# Patient Record
Sex: MALE | Race: WHITE | NOT HISPANIC OR LATINO | Employment: OTHER | URBAN - NONMETROPOLITAN AREA
[De-identification: names, ages, dates, MRNs, and addresses within clinical notes are randomized per-mention and may not be internally consistent; named-entity substitution may affect disease eponyms.]

---

## 2024-08-10 ENCOUNTER — APPOINTMENT (EMERGENCY)
Dept: CT IMAGING | Facility: HOSPITAL | Age: 84
End: 2024-08-10
Payer: MEDICARE

## 2024-08-10 ENCOUNTER — APPOINTMENT (EMERGENCY)
Dept: RADIOLOGY | Facility: HOSPITAL | Age: 84
End: 2024-08-10
Payer: MEDICARE

## 2024-08-10 ENCOUNTER — HOSPITAL ENCOUNTER (EMERGENCY)
Facility: HOSPITAL | Age: 84
Discharge: HOME | End: 2024-08-10
Attending: EMERGENCY MEDICINE
Payer: MEDICARE

## 2024-08-10 VITALS
SYSTOLIC BLOOD PRESSURE: 167 MMHG | DIASTOLIC BLOOD PRESSURE: 78 MMHG | OXYGEN SATURATION: 95 % | HEART RATE: 66 BPM | RESPIRATION RATE: 18 BRPM | TEMPERATURE: 97.2 F | WEIGHT: 261.47 LBS

## 2024-08-10 DIAGNOSIS — S02.401A CLOSED FRACTURE OF MAXILLARY SINUS, INITIAL ENCOUNTER (HCC): ICD-10-CM

## 2024-08-10 DIAGNOSIS — S22.42XA CLOSED FRACTURE OF MULTIPLE RIBS OF LEFT SIDE, INITIAL ENCOUNTER: Primary | ICD-10-CM

## 2024-08-10 PROCEDURE — 71250 CT THORAX DX C-: CPT

## 2024-08-10 PROCEDURE — 70486 CT MAXILLOFACIAL W/O DYE: CPT

## 2024-08-10 PROCEDURE — 99285 EMERGENCY DEPT VISIT HI MDM: CPT | Performed by: EMERGENCY MEDICINE

## 2024-08-10 PROCEDURE — 72170 X-RAY EXAM OF PELVIS: CPT

## 2024-08-10 PROCEDURE — 70450 CT HEAD/BRAIN W/O DYE: CPT

## 2024-08-10 PROCEDURE — 72125 CT NECK SPINE W/O DYE: CPT

## 2024-08-10 PROCEDURE — 99284 EMERGENCY DEPT VISIT MOD MDM: CPT

## 2024-08-10 PROCEDURE — 74176 CT ABD & PELVIS W/O CONTRAST: CPT

## 2024-08-10 PROCEDURE — 71045 X-RAY EXAM CHEST 1 VIEW: CPT

## 2024-08-10 RX ORDER — CLINDAMYCIN HCL 300 MG
300 CAPSULE ORAL EVERY 8 HOURS SCHEDULED
Qty: 21 CAPSULE | Refills: 0 | Status: SHIPPED | OUTPATIENT
Start: 2024-08-10 | End: 2024-08-17

## 2024-08-10 RX ORDER — OXYCODONE AND ACETAMINOPHEN 5; 325 MG/1; MG/1
1 TABLET ORAL EVERY 8 HOURS PRN
Qty: 12 TABLET | Refills: 0 | Status: SHIPPED | OUTPATIENT
Start: 2024-08-10 | End: 2024-08-20

## 2024-08-10 RX ORDER — DOCUSATE SODIUM 100 MG/1
100 CAPSULE, LIQUID FILLED ORAL EVERY 12 HOURS
Qty: 60 CAPSULE | Refills: 0 | Status: SHIPPED | OUTPATIENT
Start: 2024-08-10

## 2024-08-10 NOTE — DISCHARGE INSTRUCTIONS
Please use sinus precautions.  This means that:  1.  Do not blow your nose  2.  Do not drink through straws  3.  If you must sneeze, please do so with your mouth open    Please use the incentive spirometer provided to you to do 1 set of 10 deep breaths every hour while awake.  You do not need to wake up at night to do these exercises    You may use the prescribed pain medication as needed every 8 hours.  If you are using this medication, please also use the stool softener to avoid becoming constipated    Please begin taking the prescribed antibiotic.    Please schedule an appointment with the oral maxillofacial surgeons listed below or the oral maxillofacial surgeons of your choice

## 2024-08-10 NOTE — ED PROVIDER NOTES
Emergency Department Trauma Note  Bogdan Camarena 83 y.o. male MRN: [id number]  Unit/Bed#: ED 01/ED 01 Encounter: [id number]      Trauma Alert: Trauma Acuity: Trauma Evaluation  Model of Arrival:   via    Trauma Team: Current Providers  Attending Provider: Benoit Pollock MD  Registered Nurse: Ruddy Weaver RN  Consultants:     None      History of Present Illness     Chief Complaint:   Chief Complaint   Patient presents with    Fall     Pt trip and fall coming out of [employer] landing face forward. Takes aspirin     HPI:  Bogdan Camarena is a 83 y.o. male who presents with fall, head/face strike.  Mechanism:Details of Incident: trip and fall. on aspirin. Injury Date: 08/10/24 Injury Time: 1730      Tripped and fell forward onto face.  Complains of face/head pain.  Left sided chest/abdominal pain.  No shortness of breath.  No nausea or vomiting.      History provided by:  Patient   used: No    Fall  Mechanism of injury: fall    Injury location:  Face  Facial injury location:  L eye  Incident location:  Outdoors  Fall:     Fall occurred:  Standing    Impact surface:  Hard floor and concrete    Point of impact:  Face    Entrapped after fall: no    Suspicion of alcohol use: no    Suspicion of drug use: no    Tetanus status:  Unknown  Prior to arrival data:     Bystander interventions:  None    Patient ambulatory at scene: yes      Blood loss:  Minimal    Responsiveness at scene:  Alert    Orientation at scene:  Person, place, situation and time    Loss of consciousness: no      Amnesic to event: no      Airway interventions:  None    Immobilization:  C-collar    Airway condition since incident:  Stable    Breathing condition since incident:  Stable    Circulation condition since incident:  Stable    Mental status condition since incident:  Stable    Disability condition since incident:  Stable  Associated symptoms: chest pain    Associated symptoms: no abdominal pain, no back pain, no difficulty breathing, no  headaches, no hearing loss, no loss of consciousness, no nausea, no neck pain, no seizures and no vomiting      Review of Systems   Constitutional:  Negative for chills and fever.   HENT:  Negative for ear pain, hearing loss, sore throat, trouble swallowing and voice change.    Eyes:  Negative for pain and discharge.   Respiratory:  Negative for cough, shortness of breath and wheezing.    Cardiovascular:  Positive for chest pain. Negative for palpitations.   Gastrointestinal:  Negative for abdominal pain, blood in stool, constipation, diarrhea, nausea and vomiting.   Genitourinary:  Negative for dysuria, flank pain, frequency and hematuria.   Musculoskeletal:  Negative for back pain, joint swelling, neck pain and neck stiffness.   Skin:  Negative for rash and wound.   Neurological:  Negative for dizziness, seizures, loss of consciousness, syncope, facial asymmetry and headaches.   Psychiatric/Behavioral:  Negative for hallucinations, self-injury and suicidal ideas.    All other systems reviewed and are negative.      Historical Information     Immunizations:   Immunization History   Administered Date(s) Administered    COVID-19 MODERNA VACC 0.5 ML IM 01/17/2021, 02/14/2021, 11/10/2021    COVID-19 Moderna Vac BIVALENT 12 Yr+ IM 0.5 ML 10/07/2022    COVID-19 Moderna mRNA Vaccine 12 Yr+ 50 mcg/0.5 mL (Spikevax) 10/12/2023       No past medical history on file.  No family history on file.  No past surgical history on file.     No existing history information found.  No existing history information found.    Family History: No family history on file.    Meds/Allergies   None       Not on File    PHYSICAL EXAM    PE limited by: Nothing    Objective   Vitals:   First set: Temperature: (!) 97.2 °F (36.2 °C) (08/10/24 1755)  Pulse: 66 (08/10/24 1755)  Respirations: 20 (08/10/24 1755)  Blood Pressure: (!) 175/86 (08/10/24 1755)  SpO2: 95 % (08/10/24 1755)    Primary Survey:   (A) Airway: Intact  (B) Breathing: Intact  (C)  Circulation: Pulses:   Normal  (D) Disabliity: GCS 15  (E) Expose:  Completed    Secondary Survey: (Click on Physical Exam tab above)  Physical Exam  Vitals and nursing note reviewed.   Constitutional:       General: He is not in acute distress.     Appearance: He is well-developed.   HENT:      Head: Normocephalic.      Comments: Patient noted to have swelling and ecchymosis surrounding the left orbit.     Right Ear: External ear normal.      Left Ear: External ear normal.   Eyes:      General: No scleral icterus.        Right eye: No discharge.         Left eye: No discharge.      Extraocular Movements: Extraocular movements intact.      Conjunctiva/sclera: Conjunctivae normal.   Cardiovascular:      Rate and Rhythm: Normal rate and regular rhythm.      Heart sounds: Normal heart sounds. No murmur heard.  Pulmonary:      Effort: Pulmonary effort is normal.      Breath sounds: Normal breath sounds. No wheezing or rales.      Comments: No chest wall tenderness or crepitus.  Chest:      Chest wall: No tenderness.   Abdominal:      General: Bowel sounds are normal. There is no distension.      Palpations: Abdomen is soft.      Tenderness: There is no abdominal tenderness. There is no guarding or rebound.   Musculoskeletal:         General: No deformity. Normal range of motion.      Cervical back: Normal range of motion and neck supple.   Skin:     General: Skin is warm and dry.      Findings: No rash.   Neurological:      General: No focal deficit present.      Mental Status: He is alert and oriented to person, place, and time.      Cranial Nerves: No cranial nerve deficit.   Psychiatric:         Mood and Affect: Mood normal.         Behavior: Behavior normal.         Thought Content: Thought content normal.         Judgment: Judgment normal.         Cervical spine cleared by clinical criteria? No (imaging required)      Invasive Devices       None                   Lab Results:   Results Reviewed       None                    Imaging Studies:   Direct to CT: Yes  TRAUMA - CT chest abdomen pelvis wo contrast   Final Result by Josh Watts MD (08/10 1856)      Although the study was limited by the lack of intravenous contrast, there is no gross evidence of solid organ injury.      Acute fractures of the left fourth and sixth ribs.      No pneumothorax. Mild chronic interstitial lung changes as well as mild diffuse bronchial wall thickening. No infiltrate or consolidation.      No acute intra-abdominal abnormality. No free air or free fluid.         Workstation performed: [id number]         TRAUMA - CT head wo contrast   Final Result by Josh Watts MD (08/10 1829)      No acute intracranial abnormality.      Mild chronic small vessel ischemic changes.            Workstation performed: [id number]         TRAUMA - CT spine cervical wo contrast   Final Result by Josh Watts MD (08/10 1839)      No cervical spine fracture or traumatic malalignment.                  Workstation performed: [id number]         TRAUMA - CT facial bones wo contrast   Final Result by Josh Watts MD (08/10 1833)      Acute mild displaced comminuted fracture involving the anterior wall of the left maxillary sinus.      Suspected tiny acute nondisplaced fracture at the posterior wall of the left maxillary sinus         Workstation performed: [id number]         XR Trauma pelvis ap only 1 or 2 vw   Final Result by Josh Watts MD (08/10 1858)      No acute osseous abnormality.         Computerized Assisted Algorithm (CAA) may have been used to analyze all applicable images.         Workstation performed: [id number]         XR Trauma chest portable   Final Result by Josh Watts MD (08/10 1857)      No acute cardiopulmonary disease.            Workstation performed: [id number]               Procedures  Procedures         ED Course  ED Course as of 08/10/24 2107   Sat Aug 10, 2024   1913 Discussed with on-call OMFS as well  as on-call trauma, feel patient stable for discharge.  Recommend sinus precautions, antibiotics, outpatient follow-up.  Patient will be prescribed clindamycin, Percocet, Colace.  Patient provided with incentive spirometer.           Medical Decision Making  Based on the history and medical screening exam performed the diagnostic considerations include but are not limited to facial fractures, intracranial hemorrhage, cervical spine fracture, intrathoracic or intra-abdominal traumatic injury.  Long bone injuries.  Other traumatic injuries.    Based on the work-up performed in the emergency room which includes physical examination, and which may include laboratory studies and imaging as warranted including advanced imaging such as CT scan or ultrasound, the diagnostic considerations are narrowed to exclude limb or life-threatening process.    The patient is stable for discharge.    Patient's workup reveals maxillary sinus fracture and fractures of the left fourth and sixth ribs.  No other traumatic findings identified.  Discussed with on-call OMFS as well as on-call trauma, feel patient stable for discharge.  Recommend sinus precautions, antibiotics, outpatient follow-up.  Patient will be prescribed clindamycin, Percocet, Colace.  Patient provided with incentive spirometer.    Amount and/or Complexity of Data Reviewed  Radiology: ordered and independent interpretation performed. Decision-making details documented in ED Course.     Details: Traumatic findings include maxillary sinus fracture and left fourth and sixth rib fractures.  No pneumothorax.  Otherwise no other acute traumatic findings    Risk  OTC drugs.  Prescription drug management.                Disposition  [facility] Transfer: No  Final diagnoses:   Closed fracture of multiple ribs of left side, initial encounter   Closed fracture of maxillary sinus, initial encounter (HCC)     Time reflects when diagnosis was documented in both MDM as applicable and  the Disposition within this note       Time User Action Codes Description Comment    8/10/2024  7:15 PM Benoit Pollock [S22.42XA] Closed fracture of multiple ribs of left side, initial encounter     8/10/2024  7:15 PM Benoit Pollock [S02.401A] Closed fracture of maxillary sinus, initial encounter ([facility])           ED Disposition       ED Disposition   Discharge    Condition   Stable    Date/Time   Sat Aug 10, 2024  7:15 PM    Comment   Bogdan Perryz discharge to home/self care.                   Follow-up Information       Follow up With Specialties Details Why Contact Info    Alexis Estevez DO Family Medicine   [address]  [facility] [address]  [phone]      [facility] [place] [address]  [phone]          Discharge Medication List as of 8/10/2024  7:19 PM        START taking these medications    Details   clindamycin (CLEOCIN) 300 MG capsule Take 1 capsule (300 mg total) by mouth every 8 (eight) hours for 7 days, Starting Sat 8/10/2024, Until Sat 8/17/2024, Normal      docusate sodium (COLACE) 100 mg capsule Take 1 capsule (100 mg total) by mouth every 12 (twelve) hours, Starting Sat 8/10/2024, Normal      oxyCODONE-acetaminophen (Percocet) 5-325 mg per tablet Take 1 tablet by mouth every 8 (eight) hours as needed for moderate pain for up to 10 days Max Daily Amount: 3 tablets, Starting Sat 8/10/2024, Until Tue 8/20/2024 at 2359, Normal           No discharge procedures on file.    PDMP Review       None            ED Provider  Electronically Signed by           Benoit Pollock MD  08/10/24 [id number]

## 2024-08-10 NOTE — TRAUMA DOCUMENTATION
Patient given incentive spirometer at this time. Patient demonstrated understanding with teach back.

## 2024-08-30 ENCOUNTER — HOSPITAL ENCOUNTER (OUTPATIENT)
Dept: RADIOLOGY | Facility: HOSPITAL | Age: 84
End: 2024-08-30
Payer: MEDICARE

## 2024-08-30 DIAGNOSIS — R13.19 OTHER DYSPHAGIA: ICD-10-CM

## 2024-08-30 PROCEDURE — 92611 MOTION FLUOROSCOPY/SWALLOW: CPT

## 2024-08-30 PROCEDURE — 74230 X-RAY XM SWLNG FUNCJ C+: CPT

## 2024-08-30 NOTE — PROCEDURES
Video Swallow Study    Speech Pathology Videofluoroscopic Swallow Study (VFSS/VBSS/MBSS)      Patient Name: Bogdan Camarena    Today's Date: 8/30/2024    General Information;  Pt is a 84 y.o. male with a PMH remarkable for DM2, dyslipidemia, HTN, ansiety, CAD, vertigo, recent fall w/ rib fx and left orbital fx.    Current concerns for dysphagia include globus sensation and difficulty swallowing. Pt reports this started around the time of his fall. Reports it happens most with solid foods and pills. Does not follow w/ GI, does follow w/ ENT.    A VFSS was recommended to assess oropharyngeal stage swallowing skills at this time. Pt was viewed in lateral position and assessed with thin  liquid (by teaspoon, single and successive cup/straw sips), puree, soft moist food (sandwich) and solid food (cracker) and a13mm pill with thin liquid.      Oral stage:  Pt presented with minimal oral stage dysphagia.    Lip closure:  no escape  Mastication:  slow prolonged with complete re-collection,   Bolus Transport/Lingual Motion:  slowed tongue motion- piecemeal deglutition  Oral residue:  at least mild residue on oral structures,   Tongue Control:posterior escape of less than half of the bolus  Swallow Initiation: bolus head in valleculae    Pharyngeal stage:  Pt presented with minimal pharyngeal dysphagia.     Soft palate elevation:  no bolus between soft palate and pharyngeal wall   Laryngeal elevation: complete     Anterior hyoid excursion:  complete    Epiglottic movement:  complete    Laryngeal vestibule closure:   complete   Tongue base retraction:    trace column of contrast/air between TB and PW  Pharyngeal Stripping:  diminished    Pharyngeal Contraction:  incomplete     PES opening: partial with partial obstruction to flow- retrograde mvmt/retention of pill bolus above UES  Pharyngeal Residue:  No significant pharyngeal residuals- trace retrograde movement following sandwich from  UES    Management of food/liquid/barium tablet follows:   No aspiration or penetration on study.  No significant pharyngeal residuals. 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  Stasis of barium pill above UES    Penetration/Aspiration:  Thin: PAS - 1  Puree: PAS- 1  Solid: PAS- 1  Response to Aspiration: N/A           8-Point Penetration-Aspiration Scale   1 Material does not enter the airway   2 Material enters the airway, remains above the vocal folds, and is ejected  from the  airway    3 Material enters the airway, remains above the vocal folds, and is not ejected from the airway   4 Material enters the airway, contacts the vocal folds, and is ejected from the airway   5 Material enters the airway, contacts the vocal folds, and is not ejected from the airway    6 Material enters the airway, passes below the vocal folds and is ejected into the larynx or out of the airway    7 Material enters the airway, passes below the vocal folds, and is not ejected from the trachea despite effort    8 Material enters the airway, passes below the vocal folds, and no effort is made to eject         Strategies and Efficacy: Additional liquid wash required to clear barium pill from pharynx    Aspiration Response and Efficacy:  N/A    Esophageal stage:  Brief view of esophagus was completed.  Re: esophageal clearance     esophageal retention noted esophageal retention with retrograde flow below the PES noted     Assessment Summary:    Pt presents with minimal oropharyngeal dysphagia characterized by prolonged mastication and piecemeal deglutition, could be d/t current orbital bone fx and pain w/ mastication. Spillage of bolus into valleculae where swallow was triggered. Adequate laryngeal elevation/excursion and epiglottic inversion. No aspiration or penetration on study.  Noted anterior osteophyte at C5 which disrupted bolus flow and could be contributing to globus sensation.  No significant  pharyngeal residuals. 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  Stasis of barium pill above UES, required additional liquid wash to clear to upper esophagus where it again remained until additional liquid wash completed.  Of note suspected calcification of thyroid cartilage. Radiologist consulted and unable to fully correlate w/ previous studies, therefore recommend additional imaging.      Note: Images are available for review in PACS as desired.    Recommendations:   Recommended Diet:  regular diet and thin liquids   Recommended Form of Medications: Trial whole in puree  Aspiration precautions and compensatory swallowing strategies: upright posture and alternating bites and sips  Consider referral to:  back to ENT for evaluation of UES dysfunction; can consider neurosurg referral d/t osteophyte but pt declined  SLP Dysphagia therapy recommended: None    **Radiology recommends ordering additional imaging of C spine w/ obliques to  better view thyroid cartilage** PCP please consider    Results Reviewed with: patient     Whitney Kelechi, MS CCC-SLP  8/30/2024

## 2024-11-09 ENCOUNTER — APPOINTMENT (EMERGENCY)
Dept: CT IMAGING | Facility: HOSPITAL | Age: 84
End: 2024-11-09
Payer: MEDICARE

## 2024-11-09 ENCOUNTER — HOSPITAL ENCOUNTER (EMERGENCY)
Facility: HOSPITAL | Age: 84
Discharge: HOME | End: 2024-11-10
Attending: EMERGENCY MEDICINE | Admitting: EMERGENCY MEDICINE
Payer: MEDICARE

## 2024-11-09 ENCOUNTER — APPOINTMENT (EMERGENCY)
Dept: ULTRASOUND IMAGING | Facility: HOSPITAL | Age: 84
End: 2024-11-09
Payer: MEDICARE

## 2024-11-09 VITALS
HEART RATE: 57 BPM | WEIGHT: 253 LBS | OXYGEN SATURATION: 98 % | TEMPERATURE: 97.6 F | SYSTOLIC BLOOD PRESSURE: 167 MMHG | BODY MASS INDEX: 38.34 KG/M2 | DIASTOLIC BLOOD PRESSURE: 73 MMHG | RESPIRATION RATE: 16 BRPM | HEIGHT: 68 IN

## 2024-11-09 DIAGNOSIS — N43.3 HYDROCELE: ICD-10-CM

## 2024-11-09 DIAGNOSIS — K59.00 CONSTIPATION: ICD-10-CM

## 2024-11-09 DIAGNOSIS — N50.3 EPIDIDYMAL CYST: ICD-10-CM

## 2024-11-09 DIAGNOSIS — R10.9 FLANK PAIN: Primary | ICD-10-CM

## 2024-11-09 DIAGNOSIS — R79.89 ELEVATED SERUM CREATININE: ICD-10-CM

## 2024-11-09 LAB
ALBUMIN SERPL BCG-MCNC: 4.4 G/DL (ref 3.5–5)
ALP SERPL-CCNC: 39 U/L (ref 34–104)
ALT SERPL W P-5'-P-CCNC: 19 U/L (ref 7–52)
ANION GAP SERPL CALCULATED.3IONS-SCNC: 8 MMOL/L (ref 4–13)
AST SERPL W P-5'-P-CCNC: 21 U/L (ref 13–39)
BACTERIA UR QL AUTO: NORMAL /HPF
BASOPHILS # BLD AUTO: 0.05 THOUSANDS/ÂΜL (ref 0–0.1)
BASOPHILS NFR BLD AUTO: 0 % (ref 0–1)
BILIRUB SERPL-MCNC: 0.49 MG/DL (ref 0.2–1)
BILIRUB UR QL STRIP: NEGATIVE
BUN SERPL-MCNC: 22 MG/DL (ref 5–25)
CALCIUM SERPL-MCNC: 9.2 MG/DL (ref 8.4–10.2)
CHLORIDE SERPL-SCNC: 99 MMOL/L (ref 96–108)
CLARITY UR: CLEAR
CO2 SERPL-SCNC: 33 MMOL/L (ref 21–32)
COLOR UR: YELLOW
CREAT SERPL-MCNC: 1.73 MG/DL (ref 0.6–1.3)
EOSINOPHIL # BLD AUTO: 0.24 THOUSAND/ÂΜL (ref 0–0.61)
EOSINOPHIL NFR BLD AUTO: 2 % (ref 0–6)
ERYTHROCYTE [DISTWIDTH] IN BLOOD BY AUTOMATED COUNT: 13.3 % (ref 11.6–15.1)
GFR SERPL CREATININE-BSD FRML MDRD: 35 ML/MIN/1.73SQ M
GLUCOSE SERPL-MCNC: 153 MG/DL (ref 65–140)
GLUCOSE UR STRIP-MCNC: NEGATIVE MG/DL
HCT VFR BLD AUTO: 41.1 % (ref 36.5–49.3)
HGB BLD-MCNC: 12.8 G/DL (ref 12–17)
HGB UR QL STRIP.AUTO: ABNORMAL
IMM GRANULOCYTES # BLD AUTO: 0.05 THOUSAND/UL (ref 0–0.2)
IMM GRANULOCYTES NFR BLD AUTO: 0 % (ref 0–2)
KETONES UR STRIP-MCNC: NEGATIVE MG/DL
LACTATE SERPL-SCNC: 1.9 MMOL/L (ref 0.5–2)
LEUKOCYTE ESTERASE UR QL STRIP: NEGATIVE
LIPASE SERPL-CCNC: 35 U/L (ref 11–82)
LYMPHOCYTES # BLD AUTO: 2.45 THOUSANDS/ÂΜL (ref 0.6–4.47)
LYMPHOCYTES NFR BLD AUTO: 19 % (ref 14–44)
MCH RBC QN AUTO: 27.9 PG (ref 26.8–34.3)
MCHC RBC AUTO-ENTMCNC: 31.1 G/DL (ref 31.4–37.4)
MCV RBC AUTO: 90 FL (ref 82–98)
MONOCYTES # BLD AUTO: 0.94 THOUSAND/ÂΜL (ref 0.17–1.22)
MONOCYTES NFR BLD AUTO: 7 % (ref 4–12)
NEUTROPHILS # BLD AUTO: 8.91 THOUSANDS/ÂΜL (ref 1.85–7.62)
NEUTS SEG NFR BLD AUTO: 72 % (ref 43–75)
NITRITE UR QL STRIP: NEGATIVE
NON-SQ EPI CELLS URNS QL MICRO: NORMAL /HPF
NRBC BLD AUTO-RTO: 0 /100 WBCS
PH UR STRIP.AUTO: 6 [PH]
PLATELET # BLD AUTO: 216 THOUSANDS/UL (ref 149–390)
PMV BLD AUTO: 10.1 FL (ref 8.9–12.7)
POTASSIUM SERPL-SCNC: 3.5 MMOL/L (ref 3.5–5.3)
PROT SERPL-MCNC: 7.6 G/DL (ref 6.4–8.4)
PROT UR STRIP-MCNC: ABNORMAL MG/DL
RBC # BLD AUTO: 4.58 MILLION/UL (ref 3.88–5.62)
RBC #/AREA URNS AUTO: NORMAL /HPF
SODIUM SERPL-SCNC: 140 MMOL/L (ref 135–147)
SP GR UR STRIP.AUTO: 1.02 (ref 1–1.03)
UROBILINOGEN UR QL STRIP.AUTO: 0.2 E.U./DL
WBC # BLD AUTO: 12.64 THOUSAND/UL (ref 4.31–10.16)
WBC #/AREA URNS AUTO: NORMAL /HPF

## 2024-11-09 PROCEDURE — 76870 US EXAM SCROTUM: CPT

## 2024-11-09 PROCEDURE — 81001 URINALYSIS AUTO W/SCOPE: CPT | Performed by: EMERGENCY MEDICINE

## 2024-11-09 PROCEDURE — 96375 TX/PRO/DX INJ NEW DRUG ADDON: CPT

## 2024-11-09 PROCEDURE — 99285 EMERGENCY DEPT VISIT HI MDM: CPT | Performed by: EMERGENCY MEDICINE

## 2024-11-09 PROCEDURE — 96361 HYDRATE IV INFUSION ADD-ON: CPT

## 2024-11-09 PROCEDURE — 36415 COLL VENOUS BLD VENIPUNCTURE: CPT | Performed by: EMERGENCY MEDICINE

## 2024-11-09 PROCEDURE — 80053 COMPREHEN METABOLIC PANEL: CPT | Performed by: EMERGENCY MEDICINE

## 2024-11-09 PROCEDURE — 85025 COMPLETE CBC W/AUTO DIFF WBC: CPT | Performed by: EMERGENCY MEDICINE

## 2024-11-09 PROCEDURE — 83690 ASSAY OF LIPASE: CPT | Performed by: EMERGENCY MEDICINE

## 2024-11-09 PROCEDURE — 96376 TX/PRO/DX INJ SAME DRUG ADON: CPT

## 2024-11-09 PROCEDURE — 96374 THER/PROPH/DIAG INJ IV PUSH: CPT

## 2024-11-09 PROCEDURE — 74177 CT ABD & PELVIS W/CONTRAST: CPT

## 2024-11-09 PROCEDURE — 99284 EMERGENCY DEPT VISIT MOD MDM: CPT

## 2024-11-09 PROCEDURE — 83605 ASSAY OF LACTIC ACID: CPT | Performed by: EMERGENCY MEDICINE

## 2024-11-09 RX ORDER — OXYCODONE AND ACETAMINOPHEN 5; 325 MG/1; MG/1
1 TABLET ORAL EVERY 4 HOURS PRN
Qty: 9 TABLET | Refills: 0 | Status: SHIPPED | OUTPATIENT
Start: 2024-11-09

## 2024-11-09 RX ORDER — POLYETHYLENE GLYCOL 3350 17 G/17G
17 POWDER, FOR SOLUTION ORAL DAILY
Qty: 10 EACH | Refills: 0 | Status: SHIPPED | OUTPATIENT
Start: 2024-11-09

## 2024-11-09 RX ORDER — MORPHINE SULFATE 4 MG/ML
4 INJECTION, SOLUTION INTRAMUSCULAR; INTRAVENOUS ONCE
Status: COMPLETED | OUTPATIENT
Start: 2024-11-09 | End: 2024-11-09

## 2024-11-09 RX ORDER — SULFAMETHOXAZOLE AND TRIMETHOPRIM 800; 160 MG/1; MG/1
1 TABLET ORAL 2 TIMES DAILY
Qty: 14 TABLET | Refills: 0 | Status: SHIPPED | OUTPATIENT
Start: 2024-11-09 | End: 2024-11-09

## 2024-11-09 RX ORDER — OXYCODONE AND ACETAMINOPHEN 5; 325 MG/1; MG/1
1 TABLET ORAL EVERY 4 HOURS PRN
Qty: 9 TABLET | Refills: 0 | Status: SHIPPED | OUTPATIENT
Start: 2024-11-09 | End: 2024-11-09

## 2024-11-09 RX ORDER — ONDANSETRON 2 MG/ML
4 INJECTION INTRAMUSCULAR; INTRAVENOUS ONCE
Status: COMPLETED | OUTPATIENT
Start: 2024-11-09 | End: 2024-11-09

## 2024-11-09 RX ORDER — LEVOFLOXACIN 500 MG/1
500 TABLET, FILM COATED ORAL DAILY
Qty: 7 TABLET | Refills: 0 | Status: SHIPPED | OUTPATIENT
Start: 2024-11-09 | End: 2024-11-16

## 2024-11-09 RX ORDER — AMOXICILLIN 250 MG
1 CAPSULE ORAL DAILY
Qty: 20 TABLET | Refills: 0 | Status: SHIPPED | OUTPATIENT
Start: 2024-11-09

## 2024-11-09 RX ADMIN — IOHEXOL 100 ML: 350 INJECTION, SOLUTION INTRAVENOUS at 19:55

## 2024-11-09 RX ADMIN — SODIUM CHLORIDE 1000 ML: 0.9 INJECTION, SOLUTION INTRAVENOUS at 21:14

## 2024-11-09 RX ADMIN — MORPHINE SULFATE 4 MG: 4 INJECTION INTRAVENOUS at 18:43

## 2024-11-09 RX ADMIN — ONDANSETRON 4 MG: 2 INJECTION INTRAMUSCULAR; INTRAVENOUS at 17:26

## 2024-11-09 RX ADMIN — SODIUM CHLORIDE 1000 ML: 0.9 INJECTION, SOLUTION INTRAVENOUS at 17:24

## 2024-11-09 RX ADMIN — MORPHINE SULFATE 2 MG: 2 INJECTION, SOLUTION INTRAMUSCULAR; INTRAVENOUS at 17:27

## 2024-11-09 NOTE — ED PROVIDER NOTES
ED Disposition       None          Assessment & Plan       Medical Decision Making  Amount and/or Complexity of Data Reviewed  Labs: ordered.  Radiology: ordered.    Risk  Prescription drug management.        ED Course as of 11/09/24 2124   Sat Nov 09, 2024 2050 Patient states pain reasonably controlled.  Placed on oxygen by nursing due to low oxygen saturation, suspected to be due to medication.  Patient denied any shortness of breath.  No wheezing.  Still not able to give a urine sample.  Declining straight catheterization at this time.  Additional IV fluids ordered.  Awaiting CT scan results.  Labs and ultrasound results discussed with patient.   2123 Care turned over to Dr. Calvin pending CT results, reassessment, further disposition as indicated.       Medications   sodium chloride 0.9 % bolus 1,000 mL (1,000 mL Intravenous New Bag 11/9/24 2114)   sodium chloride 0.9 % bolus 1,000 mL (0 mL Intravenous Stopped 11/9/24 1845)   morphine injection 2 mg (2 mg Intravenous Given 11/9/24 1727)   ondansetron (ZOFRAN) injection 4 mg (4 mg Intravenous Given 11/9/24 1726)   morphine injection 4 mg (4 mg Intravenous Given 11/9/24 1843)   iohexol (OMNIPAQUE) 350 MG/ML injection (SINGLE-DOSE) 100 mL (100 mL Intravenous Given 11/9/24 1955)       ED Risk Strat Scores                           SBIRT 22yo+      Flowsheet Row Most Recent Value   Initial Alcohol Screen: US AUDIT-C     1. How often do you have a drink containing alcohol? 0 Filed at: 11/09/2024 1646   2. How many drinks containing alcohol do you have on a typical day you are drinking?  0 Filed at: 11/09/2024 1646   3b. FEMALE Any Age, or MALE 65+: How often do you have 4 or more drinks on one occassion? 0 Filed at: 11/09/2024 1646   Audit-C Score 0 Filed at: 11/09/2024 1646   BRODY: How many times in the past year have you...    Used an illegal drug or used a prescription medication for non-medical reasons? Never Filed at: 11/09/2024 1646                             History of Present Illness       Chief Complaint   Patient presents with    Flank Pain     Pt presented to this ED with spouse c/o right flank pain radiating to groin w/right testicle swelling starting today. Denies travel/sob/fevers/cough/n/v/d       History reviewed. No pertinent past medical history.   History reviewed. No pertinent surgical history.   History reviewed. No pertinent family history.   Social History     Tobacco Use    Smoking status: Former     Types: Cigarettes    Smokeless tobacco: Never   Substance Use Topics    Alcohol use: Not Currently    Drug use: Never      E-Cigarette/Vaping      E-Cigarette/Vaping Substances      I have reviewed and agree with the history as documented.     Patient presents to the emergency department accompanied by his wife for evaluation of right flank pain that radiates into the right groin along with right testicular pain and swelling.  Symptoms started acutely at 1400.  Complains of nausea.  Has not vomited.  Rates pain as moderate to severe.  Denies prior similar symptoms.  No recent fevers.  Onset while showering.  No medications taken prior to arrival.  No known history of kidney stones.  No known hernia.  History of coronary artery disease, diabetes.  No other complaints, modifying factors, or associated symptoms.        Review of Systems   All other systems reviewed and are negative.          Objective       ED Triage Vitals [11/09/24 1555]   Temperature Pulse Blood Pressure Respirations SpO2 Patient Position - Orthostatic VS   97.6 °F (36.4 °C) 72 (!) 179/82 18 95 % Sitting      Temp src Heart Rate Source BP Location FiO2 (%) Pain Score    -- Monitor Right arm -- 9      Vitals      Date and Time Temp Pulse SpO2 Resp BP Pain Score FACES Pain Rating User   11/09/24 2100 -- 63 97 % 17 163/71 -- -- MD   11/09/24 2015 -- 77 87 % -- -- -- -- AL   11/09/24 1843 -- -- -- -- -- 10 - Worst Possible Pain -- RR   11/09/24 1727 -- -- -- -- -- 9 -- RR   11/09/24 1555  97.6 °F (36.4 °C) 72 95 % 18 179/82 9 -- AC            Physical Exam  Vitals and nursing note reviewed.   Constitutional:       General: He is not in acute distress.     Appearance: He is well-developed. He is not ill-appearing, toxic-appearing or diaphoretic.      Comments: Appears uncomfortable.   HENT:      Head: Normocephalic and atraumatic.   Eyes:      Conjunctiva/sclera: Conjunctivae normal.   Cardiovascular:      Rate and Rhythm: Normal rate and regular rhythm.      Heart sounds: No murmur heard.  Pulmonary:      Effort: Pulmonary effort is normal. No respiratory distress.      Breath sounds: Normal breath sounds.   Abdominal:      General: Abdomen is flat.      Palpations: Abdomen is soft.      Tenderness: There is no abdominal tenderness. There is no guarding or rebound.   Genitourinary:     Testes: Cremasteric reflex is present.         Right: Tenderness and swelling present. Mass not present. Cremasteric reflex is present.    Musculoskeletal:         General: No swelling. Normal range of motion.      Cervical back: Neck supple.   Skin:     General: Skin is warm and dry.      Capillary Refill: Capillary refill takes less than 2 seconds.   Neurological:      General: No focal deficit present.      Mental Status: He is alert and oriented to person, place, and time.   Psychiatric:         Mood and Affect: Mood normal.         Behavior: Behavior normal.         Results Reviewed       Procedure Component Value Units Date/Time    Comprehensive metabolic panel [[id number]]  (Abnormal) Collected: 11/09/24 1717    Lab Status: Final result Specimen: Blood from Hand, Left Updated: 11/09/24 8770     Sodium 140 mmol/L      Potassium 3.5 mmol/L      Chloride 99 mmol/L      CO2 33 mmol/L      ANION GAP 8 mmol/L      BUN 22 mg/dL      Creatinine 1.73 mg/dL      Glucose 153 mg/dL      Calcium 9.2 mg/dL      AST 21 U/L      ALT 19 U/L      Alkaline Phosphatase 39 U/L      Total Protein 7.6 g/dL      Albumin 4.4 g/dL       Total Bilirubin 0.49 mg/dL      eGFR 35 ml/min/1.73sq m     Narrative:      National Kid[employer] guidelines for Chronic Kidney Disease (CKD):     Stage 1 with normal or high GFR (GFR > 90 mL/min/1.73 square meters)    Stage 2 Mild CKD (GFR = 60-89 mL/min/1.73 square meters)    Stage 3A Moderate CKD (GFR = 45-59 mL/min/1.73 square meters)    Stage 3B Moderate CKD (GFR = 30-44 mL/min/1.73 square meters)    Stage 4 Severe CKD (GFR = 15-29 mL/min/1.73 square meters)    Stage 5 End Stage CKD (GFR <15 mL/min/1.73 square meters)  Note: GFR calculation is accurate only with a steady state creatinine    Lipase [[id number]]  (Normal) Collected: 11/09/24 1717    Lab Status: Final result Specimen: Blood from Hand, Left Updated: 11/09/24 1753     Lipase 35 u/L     Lactic acid, plasma (w/reflex if result > 2.0) [[id number]]  (Normal) Collected: 11/09/24 1717    Lab Status: Final result Specimen: Blood from Hand, Left Updated: 11/09/24 1753     LACTIC ACID 1.9 mmol/L     Narrative:      Result may be elevated if tourniquet was used during collection.    CBC and differential [[id number]]  (Abnormal) Collected: 11/09/24 1717    Lab Status: Final result Specimen: Blood from Arm, Left Updated: 11/09/24 1736     WBC 12.64 Thousand/uL      RBC 4.58 Million/uL      Hemoglobin 12.8 g/dL      Hematocrit 41.1 %      MCV 90 fL      MCH 27.9 pg      MCHC 31.1 g/dL      RDW 13.3 %      MPV 10.1 fL      Platelets 216 Thousands/uL      nRBC 0 /100 WBCs      Segmented % 72 %      Immature Grans % 0 %      Lymphocytes % 19 %      Monocytes % 7 %      Eosinophils Relative 2 %      Basophils Relative 0 %      Absolute Neutrophils 8.91 Thousands/µL      Absolute Immature Grans 0.05 Thousand/uL      Absolute Lymphocytes 2.45 Thousands/µL      Absolute Monocytes 0.94 Thousand/µL      Eosinophils Absolute 0.24 Thousand/µL      Basophils Absolute 0.05 Thousands/µL     UA w Reflex to Microscopic w Reflex to Culture [[id number]]     Lab Status:  No result Specimen: Urine             US scrotum and testicles   Final Interpretation by Kevin Ramirez MD (11/09 2010)      7.5 x 6.4 x 3.8 cm complex hypoechoic structure/area adjacent to the right testicle. Differential diagnosis would include a large epididymal cyst. A complex hydrocele is considered less likely.      Workstation performed: [id number]         CT abdomen pelvis with contrast    (Results Pending)       Procedures    ED Medication and Procedure Management   Prior to Admission Medications   Prescriptions Last Dose Informant Patient Reported? Taking?   docusate sodium (COLACE) 100 mg capsule   No No   Sig: Take 1 capsule (100 mg total) by mouth every 12 (twelve) hours      Facility-Administered Medications: None     Patient's Medications   Discharge Prescriptions    No medications on file     No discharge procedures on file.  ED SEPSIS DOCUMENTATION            Romain Franz MD  11/09/24 5766

## 2024-11-10 NOTE — DISCHARGE INSTRUCTIONS
You were seen in the emergency department for flank pain.  Your ultrasound results are below.  Your CT scan results showed a large scrotal hydrocele, gastritis or inflammation of the stomach, a large amount of stool in the colon, bilateral small inguinal hernias that contain fat.  We provided you with follow-up to urology.  Please call their office and schedule an appointment.  You also must follow-up with your primary care doctor for repeat blood work to check on your kidney function.  If your symptoms worsen or persist, please return to the emergency department immediately.      TESTES:  Testes are symmetric and normal in size.     RIGHT testis = 4.7 x 2.6 x 2.1 cm. Volume 13.6 mL  Normal contour with homogeneous smooth echotexture.  No intratesticular mass lesion or calcifications. Right testis appendix.     LEFT testis = 4.0 x 2.8 x 2.5 cm. Volume 14.6 mL  Normal contour with homogeneous smooth echotexture.  No intratesticular mass lesion or calcifications.     Doppler flow within both testes is present and symmetric.     EPIDIDYMIDES:  A normal right epididymis is not seen. There is a 6.4 x 7.5 x 3.8 centimeters complex hypoechoic area adjacent to the right testicle. As a normal-appearing right epididymis is not seen, it is uncertain whether this may represent a large epididymal cyst.   Alternatively, a complex hydrocele is not able to be excluded.  Doppler ultrasound demonstrates normal blood flow.  No epididymal lesions.     HYDROCELE: Complex left hydrocele.     VARICOCELE: None present.     SCROTUM: Scrotal thickness and appearance within normal limits. No evidence for hernia demonstrated.     IMPRESSION:     7.5 x 6.4 x 3.8 cm complex hypoechoic structure/area adjacent to the right testicle. Differential diagnosis would include a large epididymal cyst. A complex hydrocele is considered less likely.

## 2024-11-10 NOTE — ED CARE HANDOFF
Emergency Department Sign Out Note        Sign out and transfer of care from Dr. Franz. See Separate Emergency Department note.     The patient, Bogdan Camarena, was evaluated by the previous provider for flank pain.    Workup Completed:  Scrotal ultrasound, and CT scan of abdomen and pelvis, blood work    ED Course / Workup Pending (followup):  CT scan results                                  ED Course as of 11/09/24 3054   Sat Nov 09, 2024 2327 Discussed results with the patient.  Will provide him with urology follow-up and primary care follow-up for repeat blood work in the setting of his elevated creatinine.  Will start patient on antibiotics with concern for epididymitis.  Strict return precautions given.  Patient understands and agrees with treatment plan.     Procedures  Medical Decision Making  Amount and/or Complexity of Data Reviewed  Labs: ordered.  Radiology: ordered.    Risk  Prescription drug management.            Disposition  Final diagnoses:   None     ED Disposition       None          Follow-up Information    None       Patient's Medications   Discharge Prescriptions    No medications on file     No discharge procedures on file.       ED Provider  Electronically Signed by     Abram Calvin DO  11/09/24 1071

## 2024-11-10 NOTE — ED NOTES
Pt found to be 87% on RA. Placed on 3L of 02 Nasal Canula at this time. Pt has no complaints at this time. Provider made aware     Ruddy Weaver RN  11/09/24 2020

## 2024-11-11 ENCOUNTER — TELEPHONE (OUTPATIENT)
Age: 84
End: 2024-11-11

## 2024-11-11 NOTE — TELEPHONE ENCOUNTER
Wife of NP calling to schedule ED f/u appt for Epididymal cyst. Pt had US completed on 11/9/24 which showed:    IMPRESSION:     7.5 x 6.4 x 3.8 cm complex hypoechoic structure/area adjacent to the right testicle. Differential diagnosis would include a large epididymal cyst. A complex hydrocele is considered less likely.    Pt scheduled for next available on 12/4/24 at 830am.

## 2024-11-13 NOTE — TELEPHONE ENCOUNTER
Spoke with patient and made him aware that we do not have any sooner appointments. Patient verbalized understanding of message. He said that his PCP gave him pain medication at this time. Reviewed ER precautions with patient and he verbalized understanding of message.

## 2024-11-13 NOTE — TELEPHONE ENCOUNTER
Jumana from PCP office calling to see if sooner appt available for pt to be seen for epididymal cyst due to pt is having excruciating pain.     No sooner appts available at this time and PCP office is asking if clinical can review and get appt moved up. Office will be faxing over last OV notes.     Pt call back- [phone]

## 2024-11-19 ENCOUNTER — TELEPHONE (OUTPATIENT)
Dept: UROLOGY | Facility: CLINIC | Age: 84
End: 2024-11-19

## 2024-11-19 DIAGNOSIS — R35.1 NOCTURIA: Primary | ICD-10-CM

## 2024-11-19 RX ORDER — ROSUVASTATIN CALCIUM 40 MG/1
TABLET, COATED ORAL
COMMUNITY

## 2024-11-19 RX ORDER — LOSARTAN POTASSIUM 100 MG/1
TABLET ORAL
COMMUNITY

## 2024-11-19 RX ORDER — IPRATROPIUM BROMIDE 42 UG/1
SPRAY, METERED NASAL
COMMUNITY
Start: 2024-06-21

## 2024-11-19 RX ORDER — FELODIPINE 10 MG/1
TABLET, EXTENDED RELEASE ORAL
COMMUNITY

## 2024-11-19 RX ORDER — GABAPENTIN 400 MG/1
CAPSULE ORAL
COMMUNITY
Start: 2024-08-29

## 2024-11-19 RX ORDER — ASPIRIN 81 MG/1
TABLET, CHEWABLE ORAL
COMMUNITY

## 2024-11-19 RX ORDER — HYDROCHLOROTHIAZIDE 25 MG/1
TABLET ORAL
COMMUNITY

## 2024-11-19 RX ORDER — LORATADINE 10 MG/1
1 TABLET ORAL DAILY
COMMUNITY

## 2024-11-19 RX ORDER — METOPROLOL TARTRATE 100 MG/1
TABLET ORAL
COMMUNITY

## 2024-11-19 RX ORDER — DIAZEPAM 5 MG/1
TABLET ORAL
COMMUNITY

## 2024-11-19 RX ORDER — MAGNESIUM 200 MG
TABLET ORAL
COMMUNITY

## 2024-11-19 RX ORDER — NITROGLYCERIN 0.4 MG/1
TABLET SUBLINGUAL
COMMUNITY

## 2024-11-19 RX ORDER — PANTOPRAZOLE SODIUM 40 MG/1
TABLET, DELAYED RELEASE ORAL
COMMUNITY

## 2024-11-19 RX ORDER — MECLIZINE HYDROCHLORIDE 25 MG/1
TABLET ORAL
COMMUNITY

## 2024-11-19 RX ORDER — CHOLECALCIFEROL (VITAMIN D3) 25 MCG
TABLET ORAL
COMMUNITY

## 2024-11-25 PROBLEM — N50.89 TESTICULAR SWELLING, RIGHT: Status: ACTIVE | Noted: 2024-11-25

## 2024-11-25 PROBLEM — N43.3 HYDROCELE: Status: ACTIVE | Noted: 2024-11-25

## 2024-11-25 PROBLEM — N28.1 RENAL CYST: Status: ACTIVE | Noted: 2024-11-25

## 2024-11-26 NOTE — PROGRESS NOTES
UROLOGY PROGRESS NOTE         NAME: Bogdan Camarena  AGE: 84 y.o. SEX: male  : 1940   MRN: [id number]    DATE: 2024  TIME: 8:38 AM    Assessment and Plan   Procedures     Impression:   1. Testicular swelling, right  2. Renal cyst  3. Hydrocele, unspecified hydrocele type  4. Epididymal cyst  -     Ambulatory Referral to Urology  5. Hydrocele  -     Ambulatory Referral to Urology       Plan: Patient with a right epididymal cyst intermittent discomfort currently asymptomatic options include observation, or surgical excision in OR.    Patient elects for observation he will follow-up with me as needed.  Certainly in the future if it becomes more bothersome and wants to consider surgery happy to see him back.  I did recommend him to have yearly PSAs and JAMES's with his family physician.  He understands and agrees      Chief Complaint     Chief Complaint   Patient presents with    New Patient Visit    Testicle Pain     Rgiht testicle pain, 4/10 pain, swelling. Not taking anything for pain.      History of Present Illness     HPI: Bogdan Camarena is a 84 y.o. year old male who presents with follow-up ultrasound of the scrotum 2024 that showed a large 7.5 cm complex structure again to the right testicle complex hydrocele versus large epididymal cyst.  Also had a complex left hydrocele.  No intratesticular mass.    He was also having right flank pain and right testicular pain and on 2024 had a CAT scan that showed no hydronephrosis or stones.  Benign renal cysts.  Moderate prostatic enlargement.  PSA is pending.  PSA 2023 was 3.3.    Currently patient not having pain.  No irritative or obstructive voiding complaints no previous  medical surgical history has ED nonbothersome.  Reviewed CAT scan showing the renal cysts and also reviewed the scrotal ultrasound.    The following portions of the patient's history were reviewed and updated as appropriate: allergies, current medications, past family history,  "past medical history, past social history, past surgical history and problem list.  Past Medical History:   Diagnosis Date    Coronary artery disease     Diabetes mellitus (HCC)     Hypertension      Past Surgical History:   Procedure Laterality Date    BACK SURGERY      CAROTID STENT      CATARACT EXTRACTION      REPLACEMENT TOTAL KNEE Right      shoulder  Review of Systems     Const: Denies chills, fever and weight loss.  CV: Denies chest pain.  Resp: Denies SOB.  GI: Denies abdominal pain, nausea and vomiting.  : Denies symptoms other than stated above.  Musculo: Denies back pain.    Objective   /72   Pulse 79   Temp 97.8 °F (36.6 °C)   Ht 5' 8\" (1.727 m)   Wt 115 kg (254 lb 9.6 oz)   SpO2 96%   BMI 38.71 kg/m²     Physical Exam  Const: Appears healthy and well developed. No signs of acute distress present.  Resp: Respirations are regular and unlabored.   CV: Rate is regular. Rhythm is regular.  Abdomen: Abdomen is soft, nontender, and nondistended. Kidneys are not palpable.  : He had a right hydrocele very small subclinical left hydrocele testicular exam was normal.  Prostate benign feeling smooth no masses or nodules  Psych: Patient's attitude is cooperative. Mood is normal. Affect is normal.    Current Medications     Current Outpatient Medications:     aspirin 81 mg chewable tablet, Chew, Disp: , Rfl:     cholecalciferol (VITAMIN D3) 1,000 units tablet, Take by mouth, Disp: , Rfl:     Cyanocobalamin 100 MCG LOZG, Take 1 tablet by mouth daily, Disp: , Rfl:     diazepam (VALIUM) 5 mg tablet, Take by mouth, Disp: , Rfl:     felodipine (PLENDIL) 10 MG 24 hr tablet, Take by mouth, Disp: , Rfl:     gabapentin (NEURONTIN) 400 mg capsule, 3 (three) times a day, Disp: , Rfl:     hydroCHLOROthiazide 25 mg tablet, Take 12.5 mg by mouth daily, Disp: , Rfl:     ipratropium (ATROVENT) 0.06 % nasal spray, , Disp: , Rfl:     loratadine (CLARITIN) 10 mg tablet, Take 1 tablet by mouth daily, Disp: , Rfl:     " losartan (COZAAR) 100 MG tablet, Take by mouth, Disp: , Rfl:     Magnesium 200 MG TABS, Take by mouth, Disp: , Rfl:     metFORMIN (GLUCOPHAGE) 500 mg tablet, Take by mouth 3 (three) times a day, Disp: , Rfl:     metoprolol tartrate (LOPRESSOR) 100 mg tablet, Take by mouth every 12 (twelve) hours, Disp: , Rfl:     Multiple Vitamins-Minerals (Centrum Silver 50+Women) TABS, Take by mouth, Disp: , Rfl:     nitroglycerin (NITROSTAT) 0.4 mg SL tablet, Place under the tongue, Disp: , Rfl:     pantoprazole (PROTONIX) 40 mg tablet, Take by mouth, Disp: , Rfl:     rosuvastatin (CRESTOR) 40 MG tablet, Take by mouth, Disp: , Rfl:         Bradley Salvador MD

## 2024-12-04 ENCOUNTER — CONSULT (OUTPATIENT)
Dept: UROLOGY | Facility: CLINIC | Age: 84
End: 2024-12-04
Payer: MEDICARE

## 2024-12-04 ENCOUNTER — RESULTS FOLLOW-UP (OUTPATIENT)
Dept: UROLOGY | Facility: CLINIC | Age: 84
End: 2024-12-04

## 2024-12-04 VITALS
DIASTOLIC BLOOD PRESSURE: 72 MMHG | BODY MASS INDEX: 38.58 KG/M2 | TEMPERATURE: 97.8 F | HEIGHT: 68 IN | SYSTOLIC BLOOD PRESSURE: 146 MMHG | OXYGEN SATURATION: 96 % | WEIGHT: 254.6 LBS | HEART RATE: 79 BPM

## 2024-12-04 DIAGNOSIS — N43.3 HYDROCELE: ICD-10-CM

## 2024-12-04 DIAGNOSIS — N28.1 RENAL CYST: ICD-10-CM

## 2024-12-04 DIAGNOSIS — N50.3 EPIDIDYMAL CYST: ICD-10-CM

## 2024-12-04 DIAGNOSIS — N50.89 TESTICULAR SWELLING, RIGHT: Primary | ICD-10-CM

## 2024-12-04 DIAGNOSIS — N43.3 HYDROCELE, UNSPECIFIED HYDROCELE TYPE: ICD-10-CM

## 2024-12-04 PROCEDURE — 99204 OFFICE O/P NEW MOD 45 MIN: CPT | Performed by: UROLOGY

## 2024-12-04 NOTE — TELEPHONE ENCOUNTER
----- Message from Bradley Salvador MD sent at 12/4/2024 11:51 AM EST -----  Let patient know recent PSA from November 2024 was normal for his age.